# Patient Record
Sex: FEMALE | Race: WHITE | Employment: OTHER | URBAN - METROPOLITAN AREA
[De-identification: names, ages, dates, MRNs, and addresses within clinical notes are randomized per-mention and may not be internally consistent; named-entity substitution may affect disease eponyms.]

---

## 2017-01-11 ENCOUNTER — TELEPHONE (OUTPATIENT)
Dept: ENDOCRINOLOGY | Age: 62
End: 2017-01-11

## 2017-02-07 RX ORDER — ALENDRONATE SODIUM 70 MG/1
TABLET ORAL
Qty: 12 TABLET | Refills: 0 | Status: SHIPPED | OUTPATIENT
Start: 2017-02-07 | End: 2017-05-09 | Stop reason: SDUPTHER

## 2017-11-29 ENCOUNTER — TELEPHONE (OUTPATIENT)
Age: 62
End: 2017-11-29

## 2017-11-29 NOTE — TELEPHONE ENCOUNTER
Pt called and wants to know if a 90 day of fosamax can be sent to mail order pharmacy. Its cheaper on her. Would like a call back to let her know.  She said its ok to leave a message

## 2017-12-01 RX ORDER — ALENDRONATE SODIUM 70 MG/1
TABLET ORAL
Qty: 12 TABLET | Refills: 0 | Status: SHIPPED | OUTPATIENT
Start: 2017-12-01 | End: 2018-11-28

## 2017-12-13 ENCOUNTER — TELEPHONE (OUTPATIENT)
Age: 62
End: 2017-12-13

## 2017-12-15 ENCOUNTER — TELEPHONE (OUTPATIENT)
Age: 62
End: 2017-12-15

## 2017-12-15 NOTE — TELEPHONE ENCOUNTER
Spoke with pharmacy related to alendronate clarification. Patient to dissolve table in water. Alendronate TBEF is not covered and a PA is required.  Pharmacy will fax over a PA to office

## 2017-12-18 ENCOUNTER — TELEPHONE (OUTPATIENT)
Age: 62
End: 2017-12-18

## 2017-12-19 NOTE — TELEPHONE ENCOUNTER
Spoke with Veronica KENNEDY Related to alendronate sodium solution. It is 70mg/75ml per week, 1 pack will cover a month with 4 refills. . Pharmacy will need directions, such as 75ml per week.

## 2017-12-20 ENCOUNTER — TELEPHONE (OUTPATIENT)
Age: 62
End: 2017-12-20

## 2017-12-22 ENCOUNTER — TELEPHONE (OUTPATIENT)
Age: 62
End: 2017-12-22

## 2017-12-22 NOTE — TELEPHONE ENCOUNTER
Galen Song has spent considerable time on this already. When I say \"12\" and would like to say \"liquid,\" our version of Epic does not allow me to say \"liquid\" -- the only logical choice was \"tablets. \"  I did put in the directions that it was to be 70 mg unit dose liquid. I want her to have alendronate liquid (70 mg/5 mL). Spoke with pharmacy tech and pharmacist and clarified order.

## 2018-01-24 ENCOUNTER — TELEPHONE (OUTPATIENT)
Age: 63
End: 2018-01-24

## 2018-04-05 ENCOUNTER — HOSPITAL ENCOUNTER (OUTPATIENT)
Dept: MAMMOGRAPHY | Age: 63
Discharge: HOME | End: 2018-04-05
Attending: INTERNAL MEDICINE | Admitting: INTERNAL MEDICINE

## 2018-04-05 DIAGNOSIS — Z12.31 VISIT FOR SCREENING MAMMOGRAM: ICD-10-CM

## 2018-05-14 ENCOUNTER — OFFICE VISIT (OUTPATIENT)
Age: 63
End: 2018-05-14

## 2018-05-14 VITALS
BODY MASS INDEX: 17.54 KG/M2 | SYSTOLIC BLOOD PRESSURE: 100 MMHG | HEIGHT: 63 IN | DIASTOLIC BLOOD PRESSURE: 62 MMHG | WEIGHT: 99 LBS

## 2018-05-14 DIAGNOSIS — E55.9 VITAMIN D DEFICIENCY: Chronic | ICD-10-CM

## 2018-05-14 DIAGNOSIS — M81.0 OSTEOPOROSIS, POSTMENOPAUSAL: Primary | Chronic | ICD-10-CM

## 2018-05-14 DIAGNOSIS — Z51.81 MEDICATION MONITORING ENCOUNTER: Chronic | ICD-10-CM

## 2018-05-14 PROCEDURE — 3017F COLORECTAL CA SCREEN DOC REV: CPT | Performed by: INTERNAL MEDICINE

## 2018-05-14 PROCEDURE — 99214 OFFICE O/P EST MOD 30 MIN: CPT | Performed by: INTERNAL MEDICINE

## 2018-05-14 PROCEDURE — G8419 CALC BMI OUT NRM PARAM NOF/U: HCPCS | Performed by: INTERNAL MEDICINE

## 2018-05-14 PROCEDURE — 4040F PNEUMOC VAC/ADMIN/RCVD: CPT | Performed by: INTERNAL MEDICINE

## 2018-05-14 PROCEDURE — 1123F ACP DISCUSS/DSCN MKR DOCD: CPT | Performed by: INTERNAL MEDICINE

## 2018-05-14 PROCEDURE — 1090F PRES/ABSN URINE INCON ASSESS: CPT | Performed by: INTERNAL MEDICINE

## 2018-05-14 PROCEDURE — G8427 DOCREV CUR MEDS BY ELIG CLIN: HCPCS | Performed by: INTERNAL MEDICINE

## 2018-05-14 PROCEDURE — 1036F TOBACCO NON-USER: CPT | Performed by: INTERNAL MEDICINE

## 2018-05-14 PROCEDURE — G8399 PT W/DXA RESULTS DOCUMENT: HCPCS | Performed by: INTERNAL MEDICINE

## 2018-05-14 RX ORDER — ALENDRONATE SODIUM 70 MG/1
70 TABLET ORAL WEEKLY
Qty: 4 TABLET | Refills: 4 | Status: SHIPPED | OUTPATIENT
Start: 2018-05-14 | End: 2018-11-28 | Stop reason: SDUPTHER

## 2018-05-30 ENCOUNTER — TELEPHONE (OUTPATIENT)
Age: 63
End: 2018-05-30

## 2018-08-08 ENCOUNTER — OFFICE VISIT (OUTPATIENT)
Dept: ENT CLINIC | Age: 63
End: 2018-08-08

## 2018-08-08 VITALS — HEIGHT: 64 IN | BODY MASS INDEX: 17 KG/M2 | WEIGHT: 99.6 LBS

## 2018-08-08 DIAGNOSIS — M26.622 ARTHRALGIA OF LEFT TEMPOROMANDIBULAR JOINT: ICD-10-CM

## 2018-08-08 DIAGNOSIS — J02.9 SORE THROAT: ICD-10-CM

## 2018-08-08 DIAGNOSIS — H92.02 OTALGIA, LEFT: Primary | ICD-10-CM

## 2018-08-08 PROCEDURE — G8399 PT W/DXA RESULTS DOCUMENT: HCPCS | Performed by: OTOLARYNGOLOGY

## 2018-08-08 PROCEDURE — 3017F COLORECTAL CA SCREEN DOC REV: CPT | Performed by: OTOLARYNGOLOGY

## 2018-08-08 PROCEDURE — 31575 DIAGNOSTIC LARYNGOSCOPY: CPT | Performed by: OTOLARYNGOLOGY

## 2018-08-08 PROCEDURE — 1123F ACP DISCUSS/DSCN MKR DOCD: CPT | Performed by: OTOLARYNGOLOGY

## 2018-08-08 PROCEDURE — 1036F TOBACCO NON-USER: CPT | Performed by: OTOLARYNGOLOGY

## 2018-08-08 PROCEDURE — 4040F PNEUMOC VAC/ADMIN/RCVD: CPT | Performed by: OTOLARYNGOLOGY

## 2018-08-08 PROCEDURE — 1101F PT FALLS ASSESS-DOCD LE1/YR: CPT | Performed by: OTOLARYNGOLOGY

## 2018-08-08 PROCEDURE — 99204 OFFICE O/P NEW MOD 45 MIN: CPT | Performed by: OTOLARYNGOLOGY

## 2018-08-08 PROCEDURE — 1090F PRES/ABSN URINE INCON ASSESS: CPT | Performed by: OTOLARYNGOLOGY

## 2018-08-08 PROCEDURE — G8419 CALC BMI OUT NRM PARAM NOF/U: HCPCS | Performed by: OTOLARYNGOLOGY

## 2018-08-08 PROCEDURE — G8427 DOCREV CUR MEDS BY ELIG CLIN: HCPCS | Performed by: OTOLARYNGOLOGY

## 2018-08-08 ASSESSMENT — ENCOUNTER SYMPTOMS
APNEA: 0
SORE THROAT: 0
BLOOD IN STOOL: 0
VOMITING: 0
WHEEZING: 0
VOICE CHANGE: 0
CONSTIPATION: 0
SHORTNESS OF BREATH: 0
EYE DISCHARGE: 0
SINUS PAIN: 0
RHINORRHEA: 0
TROUBLE SWALLOWING: 0
NAUSEA: 0
COUGH: 0
BACK PAIN: 0
FACIAL SWELLING: 0
DIARRHEA: 0
CHOKING: 0
EYE PAIN: 0
CHEST TIGHTNESS: 0
STRIDOR: 0
COLOR CHANGE: 0
SINUS PRESSURE: 0

## 2018-08-08 NOTE — PROGRESS NOTES
speech difficulty, light-headedness, numbness and headaches. Hematological: Negative for adenopathy. Does not bruise/bleed easily. Psychiatric/Behavioral: Negative for agitation, confusion, self-injury and sleep disturbance. The patient is not nervous/anxious. Objective:   Physical Exam   Constitutional: She is oriented to person, place, and time. She appears well-developed and well-nourished. HENT:   Head: Normocephalic and atraumatic. Not macrocephalic and not microcephalic. Head is without raccoon's eyes, without Thakkar's sign, without abrasion, without contusion, without laceration, without right periorbital erythema and without left periorbital erythema. Hair is normal.       Right Ear: Hearing, tympanic membrane and external ear normal. No drainage, swelling or tenderness. No mastoid tenderness. Tympanic membrane is not perforated, not retracted and not bulging. Tympanic membrane mobility is normal. No middle ear effusion. No decreased hearing is noted. Left Ear: Hearing, tympanic membrane and external ear normal. No drainage, swelling or tenderness. No mastoid tenderness. Tympanic membrane is not perforated, not retracted and not bulging. Tympanic membrane mobility is normal.  No middle ear effusion. No decreased hearing is noted. Ears:    Nose: No mucosal edema, rhinorrhea, nose lacerations, sinus tenderness, nasal deformity, septal deviation or nasal septal hematoma. No epistaxis. No foreign bodies. Right sinus exhibits no maxillary sinus tenderness and no frontal sinus tenderness. Left sinus exhibits no maxillary sinus tenderness and no frontal sinus tenderness. Mouth/Throat: Uvula is midline. Mucous membranes are not pale, not dry and not cyanotic. No oral lesions. No trismus in the jaw. Normal dentition. No dental abscesses, uvula swelling, lacerations or dental caries. No oropharyngeal exudate, posterior oropharyngeal edema, posterior oropharyngeal erythema or tonsillar abscesses. Eyes: Lids are normal. Lids are everted and swept, no foreign bodies found. Right eye exhibits no chemosis, no discharge and no exudate. Left eye exhibits no chemosis, no discharge and no exudate. Right eye exhibits normal extraocular motion and no nystagmus. Left eye exhibits normal extraocular motion and no nystagmus. Neck: Trachea normal. Neck supple. Normal carotid pulses present. No tracheal deviation present. No thyroid mass and no thyromegaly present. Cardiovascular: Normal rate and regular rhythm. Pulmonary/Chest: No stridor. No apnea, no tachypnea and no bradypnea. No respiratory distress. Musculoskeletal:        Right shoulder: She exhibits normal range of motion. Left shoulder: She exhibits normal range of motion. Lymphadenopathy:        Head (right side): No submental, no submandibular, no tonsillar, no preauricular, no posterior auricular and no occipital adenopathy present. Head (left side): No submental, no submandibular, no tonsillar, no preauricular, no posterior auricular and no occipital adenopathy present. Right cervical: No superficial cervical, no deep cervical and no posterior cervical adenopathy present. Left cervical: No superficial cervical, no deep cervical and no posterior cervical adenopathy present. Neurological: She is alert and oriented to person, place, and time. Skin: No bruising, no laceration and no lesion noted. No erythema. Psychiatric: She has a normal mood and affect. Her speech is normal and behavior is normal.     Pre op: ear and throat pain, left. Post op: mild inflammation. Procedure : FlexibleLaryngoscopy  Surgeon: Angelita Vargas  Anesthesia: Afrin with 2% lidocaine  Note: After consent and anesthesia a flexible laryngoscopy was performed. There were no lesions in the nasopharynx, BOT, Tonsil, Pharynx, supraglottis or larynx. Pertinent findings: No lesion, masses or ulcers. Good vocal fold motion.    Tolerated without complication. Assessment:       Diagnosis Orders   1. Otalgia, left     2. Sore throat     3. Arthralgia of left temporomandibular joint             Plan:      Low concern for Fosamax and mandibular necrosis/   Likely low viral infection. F/U as needed or if symptoms worsen.          Asuncion García MD

## 2018-08-09 ENCOUNTER — TELEPHONE (OUTPATIENT)
Age: 63
End: 2018-08-09

## 2018-08-09 NOTE — TELEPHONE ENCOUNTER
Pt is having trouble with pain in her throat and jaw , she went to ent doctor yesterday . They are wondering if it could be the fosamax causing this. . She would like to know what Dr. Lucretia Chaves thinks

## 2018-11-28 RX ORDER — ALENDRONATE SODIUM 70 MG/1
TABLET ORAL
Qty: 4 TABLET | Refills: 2 | Status: SHIPPED | OUTPATIENT
Start: 2018-11-28 | End: 2019-03-25 | Stop reason: SDUPTHER

## 2019-03-26 RX ORDER — ALENDRONATE SODIUM 70 MG/1
TABLET ORAL
Qty: 4 TABLET | Refills: 3 | Status: SHIPPED | OUTPATIENT
Start: 2019-03-26 | End: 2019-05-20 | Stop reason: SDUPTHER

## 2019-04-22 ENCOUNTER — HOSPITAL ENCOUNTER (OUTPATIENT)
Dept: MAMMOGRAPHY | Age: 64
Discharge: HOME | End: 2019-04-22
Payer: MEDICARE

## 2019-04-22 DIAGNOSIS — R92.8 ABNORMAL MAMMOGRAM: ICD-10-CM

## 2019-04-22 DIAGNOSIS — Z12.31 VISIT FOR SCREENING MAMMOGRAM: ICD-10-CM

## 2019-04-22 PROCEDURE — 77065 DX MAMMO INCL CAD UNI: CPT

## 2019-04-22 PROCEDURE — 77063 BREAST TOMOSYNTHESIS BI: CPT

## 2019-05-20 ENCOUNTER — HOSPITAL ENCOUNTER (OUTPATIENT)
Dept: GENERAL RADIOLOGY | Age: 64
Discharge: HOME | End: 2019-05-20
Payer: MEDICARE

## 2019-05-20 ENCOUNTER — OFFICE VISIT (OUTPATIENT)
Dept: ENDOCRINOLOGY | Age: 64
End: 2019-05-20
Payer: MEDICARE

## 2019-05-20 ENCOUNTER — PROCEDURE VISIT (OUTPATIENT)
Dept: ENDOCRINOLOGY | Age: 64
End: 2019-05-20
Payer: MEDICARE

## 2019-05-20 VITALS
WEIGHT: 100.2 LBS | HEIGHT: 63 IN | SYSTOLIC BLOOD PRESSURE: 95 MMHG | DIASTOLIC BLOOD PRESSURE: 60 MMHG | BODY MASS INDEX: 17.75 KG/M2

## 2019-05-20 DIAGNOSIS — E55.9 VITAMIN D DEFICIENCY: ICD-10-CM

## 2019-05-20 DIAGNOSIS — Z51.81 MEDICATION MONITORING ENCOUNTER: ICD-10-CM

## 2019-05-20 DIAGNOSIS — M81.0 OSTEOPOROSIS, POSTMENOPAUSAL: Primary | ICD-10-CM

## 2019-05-20 DIAGNOSIS — M81.0 OSTEOPOROSIS, POSTMENOPAUSAL: Chronic | ICD-10-CM

## 2019-05-20 DIAGNOSIS — M81.0 OSTEOPOROSIS, POSTMENOPAUSAL: ICD-10-CM

## 2019-05-20 PROCEDURE — G8399 PT W/DXA RESULTS DOCUMENT: HCPCS | Performed by: INTERNAL MEDICINE

## 2019-05-20 PROCEDURE — 4040F PNEUMOC VAC/ADMIN/RCVD: CPT | Performed by: INTERNAL MEDICINE

## 2019-05-20 PROCEDURE — 1123F ACP DISCUSS/DSCN MKR DOCD: CPT | Performed by: INTERNAL MEDICINE

## 2019-05-20 PROCEDURE — 77080 DXA BONE DENSITY AXIAL: CPT

## 2019-05-20 PROCEDURE — G8419 CALC BMI OUT NRM PARAM NOF/U: HCPCS | Performed by: INTERNAL MEDICINE

## 2019-05-20 PROCEDURE — 1090F PRES/ABSN URINE INCON ASSESS: CPT | Performed by: INTERNAL MEDICINE

## 2019-05-20 PROCEDURE — 99215 OFFICE O/P EST HI 40 MIN: CPT | Performed by: INTERNAL MEDICINE

## 2019-05-20 PROCEDURE — G8427 DOCREV CUR MEDS BY ELIG CLIN: HCPCS | Performed by: INTERNAL MEDICINE

## 2019-05-20 PROCEDURE — 1036F TOBACCO NON-USER: CPT | Performed by: INTERNAL MEDICINE

## 2019-05-20 PROCEDURE — 77080 DXA BONE DENSITY AXIAL: CPT | Performed by: INTERNAL MEDICINE

## 2019-05-20 PROCEDURE — 3017F COLORECTAL CA SCREEN DOC REV: CPT | Performed by: INTERNAL MEDICINE

## 2019-05-20 RX ORDER — ALENDRONATE SODIUM 70 MG/1
TABLET ORAL
Qty: 12 TABLET | Refills: 4 | Status: SHIPPED | OUTPATIENT
Start: 2019-05-20 | End: 2020-06-29

## 2019-05-20 NOTE — RESULT ENCOUNTER NOTE
[facility]) Osteoporosis and [address] [place] [address]   Phone [phone]  Fax [phone]    NAME: Favio Gar   : 1955   STUDY DATE: 2019     REFERRING PROVIDER: Jimbo Shell MD    INDICATION(S) FOR PERFORMING THE STUDY:  osteoporosis, age related (M81.0)    CLINICAL INFORMATION PROVIDED BY THE PATIENT: 68-year-old woman. Natural menopause was age 44. She fractured her right shoulder 2018 (slipped on ice). No long-term corticosteroid use. She took alendronate 2017-2011 (stopped for a [place]). Alendronate was re-started 2014. Family history of osteoporosis (mother and father). EQUIPMENT: [employer]. POSITIONING: Good. REGIONS OF INTEREST: Correct. ARTIFACTS: None. STUDY VALID? Yes. Spine BMD is spuriously high because of generalized degenerative change. No adjustments were made. T-scores  Initial study: 2003* spine L1-L4 Invalid Lowest hip (right fem. neck) -1.9   Current study: 2017* spine L1-L4 ND Lowest hip (right fem. neck) -2.8     The table below shows bone mineral density (grams/cm2), the appropriate measure for comparing serial scans An increase or decrease is significant based on precision studies done at our center according to the ISCD protocol. Proximal Femur (right)   Date Fem. neck Trochanter Total hip   2003* 0.643 0.480 0.766   2006* 0.609 0.460 0.722   2006* 0.589 0.447 0.702   2007* 0.570 0.478 0.694   2008 0.581 0.547 0.695   2009 0.572 0.526 0.675   2010 0.557 0.529 0.667   2011 0.569 0.518 0.658   12/10/2012 0.595 0.450 0.649   10/23/2014* 0.516 0.347 0.598   2016* 0.526 0.358 0.608   2017* 0.540 0.352 0.621   2019 0.538 0.423 0.611   *Done with [employer] equipment; BMD converted to Hologic units. IMPRESSION:  BONE DENSITY IS LOW, CONSISTENT WITH OSTEOPOROSIS.    SINCE THE LAST DXA, BMD DID NOT CHANGE SIGNIFICANTLY IN THE RIGHT HIP. Consider repeating this study in 1-2 years to assess the patient's progress. _________________________________________________   Jyoti Bentley MD, Director, [facility] ([facility]) Osteoporosis and [address] [place] [address]

## 2019-05-20 NOTE — PROGRESS NOTES
[facility] Osteoporosis and [address] [facility]  Phone [phone]  Fax [phone]    PATIENT NAME: CHRISTA MALIK  YOB: 1955  INITIAL CONSULTATION: 12/06/2006  LAST OFFICE VISIT: 05/14/2018  TODAY'S VISIT: 05/20/2019           Labs @  11/2018    PROBLEMS:   Osteoporosis by DXA 12/2006, lowest T-score -2.6 in the right trochanter (Z-score -2.0)     Family history of osteoporosis, mother and father    Low bone density by DXA done 07/2002, lowest Z-score -1.5 LFN    BMD decreased 2003-02/2006, 02/2006-12/2006, 2012/2014, lowest T-score -3.0 RFN    Fractured right shoulder 01/2018 (hard fall, slipped on ice). Natural menopause age 44 (2006), hysterectomy 11/2018  Vitamin D deficiency, desirable 25-hydroxvvitamin D is 30 to 60    25 ng/mL 05/2006    41 ng/mL 12/2006 with vitamin D 1700 IU daily    42 ng/mL 12/2014    49 ng/mL 12/2017  Allergies and asthma (asthma is asymptomatic)    CURRENT MANAGEMENT FOR BONE HEALTH:   Calcium, diet 600 mg/d + supplement 375 mg/d = 975 mg/d  Vitamin D, multivitamin 200 IU/d + 500 IU along with calcium + 1000 IU = 1700 IU/d  Exercise, Theresa, cardio and strength training 5 x weekly  Pharmacologic therapy, Fosamax liquid 70 mg weekly started 01/2007, changed to tablets 2010 when liquid was not available, stopped 11/2011 for a drug holiday, re-started 12/2014 (dissolved in water)    PREVIOUS MEDICATIONS FOR OSTEOPOROSIS:   Actonel 35 mg weekly, 04/2006 stopped due to nausea, headaches  Evista 05/2006 stopped due to hot flushes    OTHER CURRENT MEDICATIONS (SELECTED): Qvar, albuterol, spironolactone, Retin-A,   OTC MEDICATIONS: Selenium, fish oil    CHIEF COMPLAINT: Here for f/u visit of osteoporosis and vitamin D deficiency. No new related signs or symptoms.     PAST MEDICAL HISTORY, FAMILY HISTORY, SOCIAL HISTORY AND REVIEW OF SYSTEMS:  Relevant changes since last visit (see patient questionnaire of today's date).    SUBJECTIVE:  See problem list for active/ongoing issues. She has been taking alendronate liquid but doesnt like the taste and would like to go back to dissolving the tablets in water. She slipped on ice 01/2018, had a hard fall and fractured her right shoulder which is now healed and back to normal.  She feels well overall. NEUROLOGIC EXAM: Able to rise from chair without using arms. No apparent focal motor or sensory deficit. Reflexes brisk and symmetric. Coordination appears normal.    MUSCULOSKELETAL EXAM: Gait: Intact without difficulty. Steady without assistance. Spine: Spinal contours are normal.  No spine tenderness to palpation or percussion. Ribs and pelvis: Ribs appear normal. Two finger spaces between ribs and pelvis. BONE DENSITY: Most recent done here using Hologic equipment. T-scores  Initial study: 02/26/2003* spine L1-L4 Invalid Lowest hip (right fem. neck) -1.9   Current study: 12/12/2017* spine L1-L4 ND Lowest hip (right fem. neck) -2.8     The table below shows bone mineral density (grams/cm2), the appropriate measure for comparing serial scans An increase or decrease is significant based on precision studies done at our center according to the ISCD protocol. Proximal Femur (right)   Date Fem. neck Trochanter Total hip   02/26/2003* 0.643 0.480 0.766   02/02/2006* 0.609 0.460 0.722   12/06/2006* 0.589 0.447 0.702   12/12/2007* 0.570 0.478 0.694   12/17/2008 0.581 0.547 0.695   12/23/2009 0.572 0.526 0.675   11/03/2010 0.557 0.529 0.667   11/09/2011 0.569 0.518 0.658   12/10/2012 0.595 0.450 0.649   10/23/2014* 0.516 0.347 0.598   01/04/2016* 0.526 0.358 0.608   12/12/2017* 0.540 0.352 0.621   05/20/2019 0.538 0.423 0.611   *Done with [employer] equipment; BMD converted to Hologic units. IMPRESSION:  BONE DENSITY IS LOW, CONSISTENT WITH OSTEOPOROSIS. SINCE THE LAST DXA, BMD DID NOT CHANGE SIGNIFICANTLY IN THE RIGHT HIP. LABS. 05/2015, Ca 8.9, Cr 0.7. 04/2018 Ca 9.2 Cr 0.6. 11/2018 Ca 8.9 Cr 0.5. IMAGING. DXA printouts reviewed. ASSESSMENT: Idiopathic osteoporosis with density lower than expected and lower than desirable. Bone density decreased from 2003 to 2006 and further from 02/2006 to 12/2006. Her strong family history has contributed to low BMD as well as vitamin D deficiency. Since taking Fosamax 01/2007-11/2011, bone density had been stable or increased, indicating a good response to treatment. Between 2011 and 2012, BMD may have decreased in the trochanter and 2012 and 2014 decreased at all hip sites. Alendronate was started 12/2014 and BMD has stabilized. She has not been regular about her calcium supplement. PLANS: Continue alendronate 70 mg weekly. She has been dissolving it in water but will try the tablets once again. We reviewed the Calcium Intake Calculator and looked at options for calcium supplements. She should aim for ~300 mg BID. Return appointment in 1 year with DXA      I spent 40 minutes face to face with this patient. Over 50% of that time was spent on counseling and care coordination. See assessment and plan for counseling and care coordination details. Verona Raines MD, director, [facility]) Osteoporosis and Bone Health Services    CC: Flor Damon MD

## 2020-08-18 ENCOUNTER — HOSPITAL ENCOUNTER (OUTPATIENT)
Dept: MAMMOGRAPHY | Age: 65
Discharge: HOME | End: 2020-08-18
Payer: MEDICARE

## 2020-08-18 PROCEDURE — 77063 BREAST TOMOSYNTHESIS BI: CPT

## 2020-10-19 ENCOUNTER — HOSPITAL ENCOUNTER (OUTPATIENT)
Dept: GENERAL RADIOLOGY | Age: 65
Discharge: HOME | End: 2020-10-19
Payer: MEDICARE

## 2020-10-19 ENCOUNTER — OFFICE VISIT (OUTPATIENT)
Dept: ENDOCRINOLOGY | Age: 65
End: 2020-10-19
Payer: MEDICARE

## 2020-10-19 ENCOUNTER — PROCEDURE VISIT (OUTPATIENT)
Dept: ENDOCRINOLOGY | Age: 65
End: 2020-10-19

## 2020-10-19 VITALS
HEIGHT: 63 IN | TEMPERATURE: 97.5 F | WEIGHT: 99.2 LBS | BODY MASS INDEX: 17.58 KG/M2 | DIASTOLIC BLOOD PRESSURE: 60 MMHG | SYSTOLIC BLOOD PRESSURE: 100 MMHG

## 2020-10-19 PROCEDURE — G8427 DOCREV CUR MEDS BY ELIG CLIN: HCPCS | Performed by: INTERNAL MEDICINE

## 2020-10-19 PROCEDURE — 1036F TOBACCO NON-USER: CPT | Performed by: INTERNAL MEDICINE

## 2020-10-19 PROCEDURE — G8399 PT W/DXA RESULTS DOCUMENT: HCPCS | Performed by: INTERNAL MEDICINE

## 2020-10-19 PROCEDURE — 3017F COLORECTAL CA SCREEN DOC REV: CPT | Performed by: INTERNAL MEDICINE

## 2020-10-19 PROCEDURE — 1090F PRES/ABSN URINE INCON ASSESS: CPT | Performed by: INTERNAL MEDICINE

## 2020-10-19 PROCEDURE — 99214 OFFICE O/P EST MOD 30 MIN: CPT | Performed by: INTERNAL MEDICINE

## 2020-10-19 PROCEDURE — 4040F PNEUMOC VAC/ADMIN/RCVD: CPT | Performed by: INTERNAL MEDICINE

## 2020-10-19 PROCEDURE — 77080 DXA BONE DENSITY AXIAL: CPT

## 2020-10-19 PROCEDURE — G8419 CALC BMI OUT NRM PARAM NOF/U: HCPCS | Performed by: INTERNAL MEDICINE

## 2020-10-19 PROCEDURE — 77080 DXA BONE DENSITY AXIAL: CPT | Performed by: INTERNAL MEDICINE

## 2020-10-19 PROCEDURE — 1123F ACP DISCUSS/DSCN MKR DOCD: CPT | Performed by: INTERNAL MEDICINE

## 2020-10-19 PROCEDURE — G8484 FLU IMMUNIZE NO ADMIN: HCPCS | Performed by: INTERNAL MEDICINE

## 2020-10-19 RX ORDER — ALENDRONATE SODIUM 70 MG/1
TABLET ORAL
Qty: 12 TABLET | Refills: 1 | Status: SHIPPED | OUTPATIENT
Start: 2020-10-19 | End: 2022-01-13 | Stop reason: SDUPTHER

## 2020-10-19 RX ORDER — MONTELUKAST SODIUM 10 MG/1
10 TABLET ORAL NIGHTLY
COMMUNITY

## 2020-10-19 RX ORDER — ZOLPIDEM TARTRATE 5 MG/1
TABLET ORAL
COMMUNITY
Start: 2020-10-19 | End: 2020-11-18

## 2020-10-19 RX ORDER — LEVOCETIRIZINE DIHYDROCHLORIDE 5 MG/1
TABLET, FILM COATED ORAL
COMMUNITY
Start: 2020-10-01

## 2020-10-19 RX ORDER — FAMOTIDINE 10 MG
20 TABLET ORAL 2 TIMES DAILY
COMMUNITY

## 2020-10-19 NOTE — PROGRESS NOTES
[facility]) Osteoporosis and [address] [facility] [address], [address] [facility]  Phone [phone]  Fax [phone]    PATIENT NAME: Carroll Baker OF BIRTH: 1955  INITIAL CONSULTATION: 12/06/2006  LAST OFFICE VISIT: 05/20/2019  TODAY'S VISIT: 10/19/2020    Labs @ [facility] 11/2019    PROBLEMS:   Osteoporosis by DXA 12/2006, lowest T-score -2.6 in the right trochanter (Z-score -2.0)     Family history of osteoporosis, mother and father    Low bone density by DXA done 07/2002, lowest Z-score -1.5 LFN    BMD decreased 2003-02/2006, 02/2006-12/2006, 2012/2014, lowest T-score -3.0 RFN    Fractured right shoulder 01/2018 (hard fall, slipped on ice). Natural menopause age 44 (2006), hysterectomy 11/2018  Vitamin D deficiency, desirable 25-hydroxvvitamin D is 30 to 60    25 ng/mL 05/2006    41 ng/mL 12/2006 with vitamin D 1700 IU daily    42 ng/mL 12/2014    49 ng/mL 12/2017 2020 NEW/RECURRENT, chronic urticaria    CURRENT MANAGEMENT FOR BONE HEALTH:   Calcium, diet 600 mg/d + supplement 375 mg/d = 975 mg/d  Vitamin D, multivitamin 200 IU/d + 500 IU along with calcium + 1000 IU = 1700 IU/d  Exercise, Theresa, cardio and strength training 5 x weekly  Pharmacologic therapy, Fosamax liquid 70 mg weekly started 01/2007, changed to tablets 2010 when liquid was not available, stopped 11/2011 for a drug holiday, re-started 12/2014 (dissolved in water)    PREVIOUS MEDICATIONS FOR OSTEOPOROSIS:   Actonel 35 mg weekly, 04/2006 stopped due to nausea, headaches  Evista 05/2006 stopped due to hot flushes    OTHER CURRENT MEDICATIONS (SELECTED): Qvar, albuterol, spironolactone, Retin-A,   OTC MEDICATIONS: Selenium, fish oil    CHIEF COMPLAINT: Here for f/u visit of osteoporosis and vitamin D deficiency. No new related signs or symptoms.     PAST MEDICAL HISTORY, FAMILY HISTORY, SOCIAL HISTORY AND REVIEW OF SYSTEMS:  Relevant changes since last visit (see patient questionnaire of today's date).    INTERVAL HISTORY:  See problem list for active/ongoing issues. She has been taking alendronate dissolved in water, correctly and without side effects. No falls, near-falls or fractures. Recently had a steroid taper for chronic urticaria. NEUROLOGIC EXAM: Able to rise from chair without using arms. No apparent focal motor or sensory deficit. Reflexes brisk and symmetric. Coordination appears normal.    MUSCULOSKELETAL EXAM: Gait: Intact without difficulty. Steady without assistance. Spine: Spinal contours are normal.  No spine tenderness to palpation or percussion. Ribs and pelvis: Ribs appear normal. Two finger spaces between ribs and pelvis. BONE DENSITY: Most recent done here using Hologic equipment. T-scores  Initial study: 02/26/2003* spine L1-L4 Invalid Lowest hip (right fem. neck) -1.9   Current study: 10/19/2020 spine L1-L4 ND Lowest hip (right fem. neck) -2.8     The table below shows bone mineral density (grams/cm2), the appropriate measure for comparing serial scans. An increase or decrease is significant based on precision studies done at our center according to the ISCD protocol. Proximal Femur (right)   Date Fem. neck Trochanter Total hip   02/26/2003* 0.643 0.480 0.766   02/02/2006* 0.609 0.460 0.722   12/06/2006* 0.589 0.447 0.702   12/12/2007* 0.570 0.478 0.694   12/17/2008 0.581 0.547 0.695   12/23/2009 0.572 0.526 0.675   11/03/2010 0.557 0.529 0.667   11/09/2011 0.569 0.518 0.658   12/10/2012 0.595 0.450 0.649   10/23/2014* 0.516 0.347 0.598   01/04/2016* 0.526 0.358 0.608   12/12/2017* 0.540 0.352 0.621   05/20/2019 0.538 0.423 0.611   10/19/2020 0.543 0.414 0.602   *Done with [employer] equipment; BMD converted to Hologic units. IMPRESSION:  BONE DENSITY IS LOW, CONSISTENT WITH OSTEOPOROSIS.    SINCE THE LAST DXA, BMD DID NOT CHANGE SIGNIFICANTLY IN THE RIGHT HIP. COMPARED WITH 2014, BEFORE STARTING ALENDRONATE, BMD IS HIGHER NOW IN THE TROCHANTER AND TRENDING UP IN THE FEMORAL NECK. LABS. 05/2015, Ca 8.9, Cr 0.7. 04/2018 Ca 9.2 Cr 0.6. 11/2018 Ca 8.9 Cr 0.5. 11/2019 Cr 0.6 iCa 4.6. IMAGING. DXA printouts reviewed. ASSESSMENT: Idiopathic osteoporosis with density lower than expected and lower than desirable. Bone density decreased from 2003 to 2006 and further from 02/2006 to 12/2006. Her strong family history has contributed to low BMD as well as vitamin D deficiency. Since taking Fosamax 01/2007-11/2011, bone density had been stable or increased, indicating a good response to treatment. Between 2011 and 2012, BMD may have decreased in the trochanter and 2012 and 2014 decreased at all hip sites. Alendronate was started 12/2014 and BMD has stabilized. PLANS: Continue alendronate 70 mg weekly dissolved in water. Return appointment in 1 year with DXA      I spent 25 minutes face to face with this patient. Over 50% of that time was spent on counseling and care coordination. See assessment and plan for counseling and care coordination details. Checo Bentley MD, Director, [facility]) Osteoporosis and Bone Health Services    CC: River Cardenas MD

## 2020-10-19 NOTE — RESULT ENCOUNTER NOTE
THE LAST DXA, BMD DID NOT CHANGE SIGNIFICANTLY IN THE RIGHT HIP. COMPARED WITH 2014, BEFORE STARTING ALENDRONATE, BMD IS HIGHER NOW IN THE TROCHANTER AND TRENDING UP IN THE FEMORAL NECK. Consider repeating this study in 1-2 years to assess the patient's progress. _________________________________________________   Desiree Bentley MD, Director, [facility]) Osteoporosis and [address] [place] [address]

## 2021-08-19 ENCOUNTER — HOSPITAL ENCOUNTER (OUTPATIENT)
Dept: MAMMOGRAPHY | Age: 66
Discharge: HOME | End: 2021-08-19
Payer: MEDICARE

## 2021-08-19 VITALS — WEIGHT: 100 LBS | BODY MASS INDEX: 17.72 KG/M2 | HEIGHT: 63 IN

## 2021-08-19 DIAGNOSIS — Z12.31 VISIT FOR SCREENING MAMMOGRAM: ICD-10-CM

## 2021-08-19 PROCEDURE — 77063 BREAST TOMOSYNTHESIS BI: CPT

## 2022-01-13 ENCOUNTER — TELEPHONE (OUTPATIENT)
Dept: ENDOCRINOLOGY | Age: 67
End: 2022-01-13

## 2022-01-13 RX ORDER — ALENDRONATE SODIUM 70 MG/1
TABLET ORAL
Qty: 12 TABLET | Refills: 4 | Status: SHIPPED | OUTPATIENT
Start: 2022-01-13 | End: 2022-04-11 | Stop reason: SDUPTHER

## 2022-01-13 NOTE — TELEPHONE ENCOUNTER
Patient is requesting a refill of her alendronate (FOSAMAX) 70 MG tablet           [facility] [place] [address], [address] [facility] [address], [address]   Phone:  [phone]  Fax:  [phone]

## 2022-04-11 ENCOUNTER — OFFICE VISIT (OUTPATIENT)
Dept: ENDOCRINOLOGY | Age: 67
End: 2022-04-11
Payer: MEDICARE

## 2022-04-11 ENCOUNTER — HOSPITAL ENCOUNTER (OUTPATIENT)
Dept: GENERAL RADIOLOGY | Age: 67
Discharge: HOME | End: 2022-04-11
Payer: MEDICARE

## 2022-04-11 ENCOUNTER — PROCEDURE VISIT (OUTPATIENT)
Dept: ENDOCRINOLOGY | Age: 67
End: 2022-04-11

## 2022-04-11 VITALS
SYSTOLIC BLOOD PRESSURE: 110 MMHG | DIASTOLIC BLOOD PRESSURE: 60 MMHG | WEIGHT: 97.8 LBS | BODY MASS INDEX: 17.33 KG/M2 | HEIGHT: 63 IN

## 2022-04-11 DIAGNOSIS — Z51.81 MEDICATION MONITORING ENCOUNTER: ICD-10-CM

## 2022-04-11 DIAGNOSIS — E55.9 VITAMIN D DEFICIENCY: ICD-10-CM

## 2022-04-11 DIAGNOSIS — M81.0 OSTEOPOROSIS, POSTMENOPAUSAL: Chronic | ICD-10-CM

## 2022-04-11 DIAGNOSIS — M81.0 OSTEOPOROSIS, POSTMENOPAUSAL: ICD-10-CM

## 2022-04-11 DIAGNOSIS — M81.0 OSTEOPOROSIS, POSTMENOPAUSAL: Primary | ICD-10-CM

## 2022-04-11 PROCEDURE — 99214 OFFICE O/P EST MOD 30 MIN: CPT | Performed by: INTERNAL MEDICINE

## 2022-04-11 PROCEDURE — 3017F COLORECTAL CA SCREEN DOC REV: CPT | Performed by: INTERNAL MEDICINE

## 2022-04-11 PROCEDURE — 1090F PRES/ABSN URINE INCON ASSESS: CPT | Performed by: INTERNAL MEDICINE

## 2022-04-11 PROCEDURE — G8419 CALC BMI OUT NRM PARAM NOF/U: HCPCS | Performed by: INTERNAL MEDICINE

## 2022-04-11 PROCEDURE — 4040F PNEUMOC VAC/ADMIN/RCVD: CPT | Performed by: INTERNAL MEDICINE

## 2022-04-11 PROCEDURE — G8399 PT W/DXA RESULTS DOCUMENT: HCPCS | Performed by: INTERNAL MEDICINE

## 2022-04-11 PROCEDURE — 77080 DXA BONE DENSITY AXIAL: CPT

## 2022-04-11 PROCEDURE — 1123F ACP DISCUSS/DSCN MKR DOCD: CPT | Performed by: INTERNAL MEDICINE

## 2022-04-11 PROCEDURE — 77080 DXA BONE DENSITY AXIAL: CPT | Performed by: INTERNAL MEDICINE

## 2022-04-11 PROCEDURE — G8427 DOCREV CUR MEDS BY ELIG CLIN: HCPCS | Performed by: INTERNAL MEDICINE

## 2022-04-11 PROCEDURE — 1036F TOBACCO NON-USER: CPT | Performed by: INTERNAL MEDICINE

## 2022-04-11 RX ORDER — ALENDRONATE SODIUM 70 MG/1
TABLET ORAL
Qty: 12 TABLET | Refills: 4 | Status: SHIPPED | OUTPATIENT
Start: 2022-04-11

## 2022-04-11 NOTE — RESULT ENCOUNTER NOTE
[facility]) Osteoporosis and [address] [facility] [address] [facility], [address] [place] [address],[facility]  Phone [phone]  Fax [phone]    NAME: Maria Del Rosario Newton   : 1955   STUDY DATE: 2022     REFERRING PROVIDER: Gibran Steve MD    INDICATION(S) FOR PERFORMING THE STUDY:  osteoporosis, age related (M81.0)    CLINICAL INFORMATION PROVIDED BY THE PATIENT: 26-year-old woman. Natural menopause was age 44. She fractured her right shoulder 2018 (slipped on ice). No long-term corticosteroid use. She took alendronate 2007-2011 (stopped for a [place]). Alendronate was re-started 2014. Family history of osteoporosis (mother and father). EQUIPMENT: [employer]. POSITIONING: Good. REGIONS OF INTEREST: Correct. ARTIFACTS: None. STUDY VALID? Yes. Spine BMD is spuriously high because of generalized degenerative change. No adjustments were made. T-scores  Initial study: 2003* spine L1-L4 Invalid Lowest hip (right fem. neck) -1.9   Current study: 2022 spine L1-L4 ND Lowest hip (right fem. neck) -2.7     The table below shows bone mineral density (grams/cm2), the appropriate measure for comparing serial scans. An increase or decrease is significant based on precision studies done at our center according to the ISCD protocol. Proximal Femur (right)   Date Fem. neck Trochanter Total hip   2003* 0.643 0.480 0.766   2006* 0.589 0.447 0.702   2007* 0.570 0.478 0.694   2011 0.569 0.518 0.658   12/10/2012 0.595 0.450 0.649   10/23/2014* 0.516 0.347 0.598   2017* 0.540 0.352 0.621   10/19/2020 0.543 0.414 0.602   2022 0.551 0.443 0.612   *Done with [employer] equipment; BMD converted to Hologic units. IMPRESSION:  BONE DENSITY IS LOW, CONSISTENT WITH OSTEOPOROSIS.    SINCE THE LAST DXA, BMD DID NOT CHANGE SIGNIFICANTLY IN THE RIGHT HIP. COMPARED WITH 2014, BEFORE STARTING ALENDRONATE, BMD IS HIGHER NOW IN THE FEMORAL NECK AND TROCHANTER AND TRENDING UP IN THE TOTAL HIP. Consider repeating this study in 1-2 years to assess the patient's progress. _________________________________________________   Rojelio Bentley MD, Director, [facility] ([facility]) Osteoporosis and [address] [place] [address]

## 2022-04-11 NOTE — PROGRESS NOTES
[facility] [address] [facility]  Phone [phone]  Fax [phone]    PATIENT NAME: Varinder Mattson OF BIRTH: 1955  INITIAL CONSULTATION: 12/06/2006  LAST OFFICE VISIT: 10/19/2020  TODAY'S VISIT: 04/11/2022    Labs @  06/2021    PROBLEMS:   Osteoporosis by DXA 12/2006, lowest T-score -2.6 in the right trochanter (Z-score -2.0)     Family history of osteoporosis, mother and father    Low bone density by DXA done 07/2002, lowest Z-score -1.5 LFN    BMD decreased 2003-02/2006, 02/2006-12/2006, 2012/2014, lowest T-score -3.0 RFN    Fractured right shoulder 01/2018 (hard fall, slipped on ice)    11/2021 fractured patella (fell hard), surgery required. Natural menopause age 44 (2006), hysterectomy 11/2018  Vitamin D deficiency, desirable 25-hydroxvvitamin D is 30 to 60    25 ng/mL 05/2006    41 ng/mL 12/2006 with vitamin D 1700 IU daily    42 ng/mL 12/2014    49 ng/mL 12/2017 2020 NEW/RECURRENT, chronic urticaria    CURRENT MANAGEMENT FOR BONE HEALTH:   Calcium, diet 600 mg/d + supplement 375 mg/d = 975 mg/d  Vitamin D, multivitamin 200 IU/d + 500 IU along with calcium + 1000 IU = 1700 IU/d  Exercise, Theresa, cardio and strength training 5 x weekly  Pharmacologic therapy, Fosamax liquid 70 mg weekly started 01/2007, changed to tablets 2010 when liquid was not available, stopped 11/2011 for a holiday, re-started 12/2014 (dissolved in water)    PREVIOUS MEDICATIONS FOR OSTEOPOROSIS:   Actonel 35 mg weekly, 04/2006 stopped due to nausea, headaches  Evista 05/2006 stopped due to hot flushes    OTHER CURRENT MEDICATIONS (SELECTED): Qvar, albuterol, spironolactone, Retin-A,   OTC MEDICATIONS: Selenium, fish oil    CHIEF COMPLAINT: Here for f/u visit of osteoporosis and vitamin D deficiency. No new related signs or symptoms.     PAST MEDICAL HISTORY, FAMILY HISTORY, SOCIAL HISTORY AND REVIEW OF SYSTEMS:  Relevant changes since last visit (see patient questionnaire of today's date). INTERVAL HISTORY:  See problem list for active/ongoing issues. She has been taking alendronate dissolved in water, correctly and without side effects. 11/2021, fell hard, fractured patella. Surgery required. Still working with PT.    NEUROLOGIC EXAM: Able to rise from chair without using arms. No apparent focal motor or sensory deficit. Reflexes brisk and symmetric. Coordination appears normal.  MUSCULOSKELETAL EXAM: Gait: Intact without difficulty. Steady without assistance. Spine: Spinal contours are normal.  No spine tenderness to palpation or percussion. Ribs and pelvis: Ribs appear normal. Two finger spaces between ribs and pelvis. BONE DENSITY: Most recent done here using Hologic equipment. T-scores  Initial study: 02/26/2003* spine L1-L4 Invalid Lowest hip (right fem. neck) -1.9   Current study: 04/11/2022 spine L1-L4 ND Lowest hip (right fem. neck) -2.7     The table below shows bone mineral density (grams/cm2), the appropriate measure for comparing serial scans. An increase or decrease is significant based on precision studies done at our center according to the ISCD protocol. Proximal Femur (right)   Date Fem. neck Trochanter Total hip   02/26/2003* 0.643 0.480 0.766   12/06/2006* 0.589 0.447 0.702   12/12/2007* 0.570 0.478 0.694   11/09/2011 0.569 0.518 0.658   12/10/2012 0.595 0.450 0.649   10/23/2014* 0.516 0.347 0.598   12/12/2017* 0.540 0.352 0.621   10/19/2020 0.543 0.414 0.602   04/11/2022 0.551 0.443 0.612   *Done with [employer] equipment; BMD converted to Hologic units. IMPRESSION:  BONE DENSITY IS LOW, CONSISTENT WITH OSTEOPOROSIS. SINCE THE LAST DXA, BMD DID NOT CHANGE SIGNIFICANTLY IN THE RIGHT HIP. COMPARED WITH 2014, BEFORE STARTING ALENDRONATE, BMD IS HIGHER NOW IN THE FEMORAL NECK AND TROCHANTER AND TRENDING UP IN THE TOTAL HIP.      LABS. 05/2015, Ca 8.9, Cr 0.7. 04/2018 Ca 9.2 Cr 0.6. 11/2018 Ca 8.9 Cr 0.5. 11/2019 Cr 0.6 iCa 4.6.  06/2021 Ca 9.3 Cr 0.7. IMAGING. DXA printouts reviewed. 06/2021 LS XR.    ASSESSMENT: Idiopathic osteoporosis, bone density lower than expected and lower than desirable. Bone density decreased [id number] and further 02/2006-12/2006. Her strong family history has contributed to low BMD as well as vitamin D deficiency. Since taking Fosamax 01/2007-11/2011, bone density had been stable or increased, indicating a good response to treatment. Between 2011 and 2012, BMD may have decreased in the trochanter and 2012 and 2014 decreased at all hip sites. Alendronate was restarted 12/2014 and BMD has improved. Patella fracture is not related to osteoporosis. PLANS: Continue alendronate 70 mg weekly dissolved in water. Discussed possible holiday after 10 years (12/2024). Return appointment in 1 year with DXA. Time spent today: 30-39 minutes. Christoph Bentley MD, Director, [facility]) Osteoporosis and Bone Health Services    CC: Avi Ríos MD

## 2022-08-25 ENCOUNTER — HOSPITAL ENCOUNTER (OUTPATIENT)
Dept: MAMMOGRAPHY | Age: 67
Discharge: HOME | End: 2022-08-25
Payer: MEDICARE

## 2022-08-25 VITALS — HEIGHT: 63 IN | BODY MASS INDEX: 16.83 KG/M2 | WEIGHT: 95 LBS

## 2022-08-25 DIAGNOSIS — Z12.31 VISIT FOR SCREENING MAMMOGRAM: ICD-10-CM

## 2022-08-25 PROCEDURE — 77063 BREAST TOMOSYNTHESIS BI: CPT

## 2022-08-31 ENCOUNTER — HOSPITAL ENCOUNTER (OUTPATIENT)
Dept: MAMMOGRAPHY | Age: 67
Discharge: HOME | End: 2022-08-31
Payer: MEDICARE

## 2022-08-31 ENCOUNTER — HOSPITAL ENCOUNTER (OUTPATIENT)
Dept: ULTRASOUND IMAGING | Age: 67
Discharge: HOME | End: 2022-08-31
Payer: MEDICARE

## 2022-08-31 DIAGNOSIS — R92.8 ABNORMAL MAMMOGRAM: ICD-10-CM

## 2022-08-31 PROCEDURE — 77065 DX MAMMO INCL CAD UNI: CPT

## 2022-08-31 PROCEDURE — 76642 ULTRASOUND BREAST LIMITED: CPT

## 2023-05-22 ENCOUNTER — OFFICE VISIT (OUTPATIENT)
Dept: ENDOCRINOLOGY | Age: 68
End: 2023-05-22
Payer: MEDICARE

## 2023-05-22 ENCOUNTER — HOSPITAL ENCOUNTER (OUTPATIENT)
Dept: GENERAL RADIOLOGY | Age: 68
Discharge: HOME | End: 2023-05-22
Payer: MEDICARE

## 2023-05-22 ENCOUNTER — PROCEDURE VISIT (OUTPATIENT)
Dept: ENDOCRINOLOGY | Age: 68
End: 2023-05-22

## 2023-05-22 VITALS
DIASTOLIC BLOOD PRESSURE: 61 MMHG | BODY MASS INDEX: 17.96 KG/M2 | SYSTOLIC BLOOD PRESSURE: 100 MMHG | HEIGHT: 62 IN | WEIGHT: 97.6 LBS

## 2023-05-22 DIAGNOSIS — Z51.81 MEDICATION MONITORING ENCOUNTER: ICD-10-CM

## 2023-05-22 DIAGNOSIS — M81.0 OSTEOPOROSIS, POSTMENOPAUSAL: Primary | ICD-10-CM

## 2023-05-22 DIAGNOSIS — M81.0 OSTEOPOROSIS, POSTMENOPAUSAL: ICD-10-CM

## 2023-05-22 DIAGNOSIS — E55.9 VITAMIN D DEFICIENCY: ICD-10-CM

## 2023-05-22 DIAGNOSIS — M81.0 OSTEOPOROSIS, POSTMENOPAUSAL: Primary | Chronic | ICD-10-CM

## 2023-05-22 PROCEDURE — 77080 DXA BONE DENSITY AXIAL: CPT | Performed by: INTERNAL MEDICINE

## 2023-05-22 PROCEDURE — 1036F TOBACCO NON-USER: CPT | Performed by: INTERNAL MEDICINE

## 2023-05-22 PROCEDURE — 77080 DXA BONE DENSITY AXIAL: CPT

## 2023-05-22 PROCEDURE — 1123F ACP DISCUSS/DSCN MKR DOCD: CPT | Performed by: INTERNAL MEDICINE

## 2023-05-22 PROCEDURE — 99214 OFFICE O/P EST MOD 30 MIN: CPT | Performed by: INTERNAL MEDICINE

## 2023-05-22 PROCEDURE — G8399 PT W/DXA RESULTS DOCUMENT: HCPCS | Performed by: INTERNAL MEDICINE

## 2023-05-22 PROCEDURE — 3017F COLORECTAL CA SCREEN DOC REV: CPT | Performed by: INTERNAL MEDICINE

## 2023-05-22 PROCEDURE — 1090F PRES/ABSN URINE INCON ASSESS: CPT | Performed by: INTERNAL MEDICINE

## 2023-05-22 PROCEDURE — G8427 DOCREV CUR MEDS BY ELIG CLIN: HCPCS | Performed by: INTERNAL MEDICINE

## 2023-05-22 PROCEDURE — G8419 CALC BMI OUT NRM PARAM NOF/U: HCPCS | Performed by: INTERNAL MEDICINE

## 2023-05-22 RX ORDER — ALENDRONATE SODIUM 70 MG/1
TABLET ORAL
Qty: 12 TABLET | Refills: 4 | Status: SHIPPED | OUTPATIENT
Start: 2023-05-22

## 2023-05-22 NOTE — PROGRESS NOTES
[facility]) Osteoporosis and [address] [facility] [address] [facility]  Phone [phone]  Fax [phone]    PATIENT NAME: Kyleigh Estrada OF BIRTH: 1955  INITIAL CONSULTATION: 12/06/2006  LAST OFFICE VISIT: 04/11/2022  TODAY'S VISIT: 05/22/2023    Labs @  04/2023    PROBLEMS:   Osteoporosis by DXA 12/2006, lowest T-score -2.6 in the right trochanter (Z-score -2.0)     Family history of osteoporosis, mother and father    Low bone density by DXA done 07/2002, lowest Z-score -1.5 LFN    BMD decreased 2003-02/2006, 02/2006-12/2006, 2012/2014, lowest T-score -3.0 RFN    Fractured right shoulder 01/2018 (hard fall, slipped on ice)    11/2021 fractured patella (fell hard), surgery required. Natural menopause age 44 (2006), hysterectomy 11/2018  Vitamin D deficiency, desirable 25-hydroxvvitamin D is 30 to 60    25 ng/mL 05/2006    41 ng/mL 12/2006 with vitamin D 1700 IU daily    42 ng/mL 12/2014    49 ng/mL 12/2017 2020 NEW/RECURRENT, chronic urticaria    CURRENT MANAGEMENT FOR BONE HEALTH:   Calcium, diet 600 mg/d + supplement 375 mg/d = 975 mg/d  Vitamin D, multivitamin 200 IU/d + 500 IU along with calcium + 1000 IU = 1700 IU/d  Exercise, Theresa, cardio and strength training 5 x weekly  Pharmacologic therapy, Fosamax liquid 70 mg weekly started 01/2007, changed to tablets 2010 when liquid was not available, stopped 11/2011 for a holiday, re-started 12/2014 (dissolved in water)    PREVIOUS MEDICATIONS FOR OSTEOPOROSIS:   Actonel 35 mg weekly, 04/2006 stopped due to nausea, headaches  Evista 05/2006 stopped due to hot flushes    OTHER CURRENT MEDICATIONS (SELECTED): Qvar, albuterol, spironolactone, Retin-A,   OTC MEDICATIONS: Selenium, fish oil    CHIEF COMPLAINT: Here for f/u visit of osteoporosis and vitamin D deficiency. No new related signs or symptoms.     PAST MEDICAL HISTORY, FAMILY HISTORY, SOCIAL HISTORY AND REVIEW OF SYSTEMS:  Relevant changes since last visit

## 2023-05-22 NOTE — RESULT ENCOUNTER NOTE
[facility] ([facility]) Osteoporosis and [address] [facility], [address] [place] [address],[facility]  Phone [phone]  Fax [phone]    NAME: Cali Newton   : 1955   STUDY DATE: 2023         REFERRING PROVIDER: Cristino Campbell MD    INDICATION(S) FOR PERFORMING THE STUDY:  osteoporosis, age related (M81.0)    CLINICAL INFORMATION PROVIDED BY THE PATIENT: 73-year-old woman. Natural menopause was age 44. She fractured her right shoulder 2018 (slipped on ice). No long-term corticosteroid use. She took alendronate 2007-2011 (stopped for a [place]). Alendronate was re-started 2014. Family history of osteoporosis (mother and father). EQUIPMENT: [employer]. POSITIONING: Good. REGIONS OF INTEREST: Correct. ARTIFACTS: None. STUDY VALID? Yes. Spine BMD is spuriously high because of generalized degenerative change. No adjustments were made. T-scores  Initial study: 2003* spine L1-L4 Invalid Lowest hip (right fem. neck) -1.9   Current study: 2022 spine L1-L4 ND Lowest hip (right fem. neck) -2.9     The table below shows bone mineral density (grams/cm2), the appropriate measure for comparing serial scans. An increase or decrease is significant based on precision studies done at our center according to the ISCD protocol. Proximal Femur (right)   Date Fem. neck Trochanter Total hip   2003* 0.643 0.480 0.766   2006* 0.589 0.447 0.702   2007* 0.570 0.478 0.694   2011 0.569 0.518 0.658   12/10/2012 0.595 0.450 0.649   10/23/2014* 0.516 0.347 0.598   10/19/2020 0.543 0.414 0.602   2022 0.551 0.443 0.612   2023 0.558 0.426 0.594   *Done with [employer] equipment; BMD converted to Hologic units. IMPRESSION:  BONE DENSITY IS LOW, CONSISTENT WITH OSTEOPOROSIS.    SINCE THE LAST DXA, BMD DID NOT CHANGE SIGNIFICANTLY IN THE RIGHT HIP. COMPARED WITH , BEFORE STARTING ALENDRONATE, BMD IS HIGHER NOW IN THE FEMORAL NECK

## 2023-06-09 LAB — VITAMIN D 25-HYDROXY: 86.2 NG/ML (ref 30–100)

## 2023-09-07 ENCOUNTER — HOSPITAL ENCOUNTER (OUTPATIENT)
Dept: MAMMOGRAPHY | Age: 68
Discharge: HOME | End: 2023-09-07
Payer: MEDICARE

## 2023-09-07 VITALS — WEIGHT: 97 LBS | HEIGHT: 62 IN | BODY MASS INDEX: 17.85 KG/M2

## 2023-09-07 DIAGNOSIS — Z12.31 VISIT FOR SCREENING MAMMOGRAM: ICD-10-CM

## 2023-09-07 PROCEDURE — 77063 BREAST TOMOSYNTHESIS BI: CPT

## 2024-05-30 ENCOUNTER — OFFICE VISIT (OUTPATIENT)
Dept: ENDOCRINOLOGY | Age: 69
End: 2024-05-30

## 2024-05-30 ENCOUNTER — HOSPITAL ENCOUNTER (OUTPATIENT)
Dept: GENERAL RADIOLOGY | Age: 69
Discharge: HOME | End: 2024-05-30
Payer: MEDICARE

## 2024-05-30 VITALS — HEIGHT: 62 IN | BODY MASS INDEX: 17.96 KG/M2 | WEIGHT: 97.6 LBS

## 2024-05-30 DIAGNOSIS — E55.9 VITAMIN D DEFICIENCY: ICD-10-CM

## 2024-05-30 DIAGNOSIS — M81.0 OSTEOPOROSIS, POSTMENOPAUSAL: ICD-10-CM

## 2024-05-30 DIAGNOSIS — Z51.81 MEDICATION MONITORING ENCOUNTER: ICD-10-CM

## 2024-05-30 DIAGNOSIS — M81.0 OSTEOPOROSIS, POSTMENOPAUSAL: Primary | ICD-10-CM

## 2024-05-30 PROCEDURE — 77080 DXA BONE DENSITY AXIAL: CPT

## 2024-05-30 PROCEDURE — 77080 DXA BONE DENSITY AXIAL: CPT | Performed by: INTERNAL MEDICINE

## 2024-05-30 RX ORDER — MULTIVITAMIN
1 TABLET ORAL DAILY
COMMUNITY
End: 2024-05-30

## 2024-05-30 RX ORDER — ZALEPLON 10 MG/1
10 CAPSULE ORAL NIGHTLY
COMMUNITY
Start: 2024-05-06

## 2024-05-30 NOTE — PROGRESS NOTES
[facility]oporosis and Bone Health [facility]  [address]  [facility] [address]  Phone [phone]  Fax [phone]    PATIENT NAME: CHRISTA MALIK  YOB: 1955  INITIAL CONSULTATION: 12/06/2006  LAST OFFICE VISIT: 05/22/2023  TODAY'S VISIT: 05/30/2024    Labs @ [facility] 01/2024    PROBLEMS:   Osteoporosis by DXA 12/2006, lowest T-score -2.6 in the right trochanter (Z-score -2.0)     Family history of osteoporosis, mother and father    Low bone density by DXA done 07/2002, lowest Z-score -1.5 LFN    BMD decreased 2003-02/2006, 02/2006-12/2006, 2012/2014, lowest T-score -3.0 RFN    Fractured right shoulder 01/2018 (hard fall, slipped on ice)    11/2021 fractured patella (fell hard), surgery required.  Natural menopause age 50 (2006), hysterectomy 11/2018  Vitamin D deficiency, desirable 25-hydroxvvitamin D is 30 to 60    25 ng/mL 05/2006    41 ng/mL 12/2006 with vitamin D 1700 IU daily    42 ng/mL 12/2014    49 ng/mL 12/2017    57 ng/mL 01/2024 2020 NEW/RECURRENT, chronic urticaria    CURRENT MANAGEMENT FOR BONE HEALTH:   Calcium, diet 600 mg/d + supplement 375 mg/d = 975 mg/d  Vitamin D, multivitamin 200 IU/d + 500 IU along with calcium + 1000 IU = 1700 IU/d  Exercise, Theresa, cardio and strength training 5 x weekly  Pharmacologic therapy, Fosamax liquid 70 mg weekly started 01/2007, changed to tablets 2010 when liquid was not available, stopped 11/2011 for a “holiday,” re-started 12/2014 (dissolved in water)    PREVIOUS MEDICATIONS FOR OSTEOPOROSIS:   Actonel 35 mg weekly, 04/2006 stopped due to nausea, headaches  Evista 05/2006 stopped due to hot flushes    OTHER CURRENT MEDICATIONS (SELECTED): Qvar, albuterol, spironolactone, Retin-A,   OTC MEDICATIONS: Selenium, fish oil    CHIEF COMPLAINT: Here for f/u visit of osteoporosis and vitamin D deficiency.  No new related signs or symptoms.    PAST MEDICAL HISTORY, FAMILY HISTORY, SOCIAL HISTORY:  Relevant changes since last

## 2024-09-12 ENCOUNTER — HOSPITAL ENCOUNTER (OUTPATIENT)
Dept: MAMMOGRAPHY | Age: 69
Discharge: HOME | End: 2024-09-12
Payer: MEDICARE

## 2024-09-12 VITALS — HEIGHT: 63 IN | BODY MASS INDEX: 17.72 KG/M2 | WEIGHT: 100 LBS

## 2024-09-12 DIAGNOSIS — Z12.31 VISIT FOR SCREENING MAMMOGRAM: ICD-10-CM

## 2024-09-12 PROCEDURE — 77063 BREAST TOMOSYNTHESIS BI: CPT

## 2025-05-13 DIAGNOSIS — M81.0 OSTEOPOROSIS, POSTMENOPAUSAL: Primary | ICD-10-CM

## 2025-06-17 ENCOUNTER — TELEPHONE (OUTPATIENT)
Dept: ENDOCRINOLOGY | Age: 70
End: 2025-06-17

## 2025-06-17 ENCOUNTER — OFFICE VISIT (OUTPATIENT)
Dept: ENDOCRINOLOGY | Age: 70
End: 2025-06-17
Payer: MEDICARE

## 2025-06-17 ENCOUNTER — HOSPITAL ENCOUNTER (OUTPATIENT)
Dept: GENERAL RADIOLOGY | Age: 70
Discharge: HOME | End: 2025-06-17
Payer: MEDICARE

## 2025-06-17 VITALS — BODY MASS INDEX: 18 KG/M2 | HEIGHT: 63 IN

## 2025-06-17 DIAGNOSIS — M81.0 OSTEOPOROSIS, POSTMENOPAUSAL: Primary | ICD-10-CM

## 2025-06-17 DIAGNOSIS — Z51.81 MEDICATION MONITORING ENCOUNTER: ICD-10-CM

## 2025-06-17 DIAGNOSIS — E55.9 VITAMIN D DEFICIENCY: ICD-10-CM

## 2025-06-17 DIAGNOSIS — M81.0 OSTEOPOROSIS, POSTMENOPAUSAL: ICD-10-CM

## 2025-06-17 PROCEDURE — 1090F PRES/ABSN URINE INCON ASSESS: CPT | Performed by: INTERNAL MEDICINE

## 2025-06-17 PROCEDURE — 1159F MED LIST DOCD IN RCRD: CPT | Performed by: INTERNAL MEDICINE

## 2025-06-17 PROCEDURE — G8399 PT W/DXA RESULTS DOCUMENT: HCPCS | Performed by: INTERNAL MEDICINE

## 2025-06-17 PROCEDURE — 77080 DXA BONE DENSITY AXIAL: CPT | Performed by: INTERNAL MEDICINE

## 2025-06-17 PROCEDURE — 3017F COLORECTAL CA SCREEN DOC REV: CPT | Performed by: INTERNAL MEDICINE

## 2025-06-17 PROCEDURE — G8427 DOCREV CUR MEDS BY ELIG CLIN: HCPCS | Performed by: INTERNAL MEDICINE

## 2025-06-17 PROCEDURE — 1123F ACP DISCUSS/DSCN MKR DOCD: CPT | Performed by: INTERNAL MEDICINE

## 2025-06-17 PROCEDURE — 1036F TOBACCO NON-USER: CPT | Performed by: INTERNAL MEDICINE

## 2025-06-17 PROCEDURE — 77080 DXA BONE DENSITY AXIAL: CPT

## 2025-06-17 PROCEDURE — 99214 OFFICE O/P EST MOD 30 MIN: CPT | Performed by: INTERNAL MEDICINE

## 2025-06-17 PROCEDURE — G2211 COMPLEX E/M VISIT ADD ON: HCPCS | Performed by: INTERNAL MEDICINE

## 2025-06-17 PROCEDURE — G8419 CALC BMI OUT NRM PARAM NOF/U: HCPCS | Performed by: INTERNAL MEDICINE

## 2025-06-17 RX ORDER — DENOSUMAB 60 MG/ML
60 INJECTION SUBCUTANEOUS ONCE
Qty: 1 ML | Refills: 0 | Status: SHIPPED | OUTPATIENT
Start: 2025-06-17 | End: 2025-06-17

## 2025-06-17 NOTE — PROGRESS NOTES
[facility] Osteoporosis and Bone Health Services  [address]  [facility] [address]  Phone [phone]  Fax [phone]    PATIENT NAME: CHRISTA MALIK  YOB: 1955  INITIAL CONSULTATION: 12/06/2006  LAST OFFICE VISIT: 05/20/2024  TODAY'S VISIT: 06/17/2025    Labs @ [facility] 12/2024    PROBLEMS:   Osteoporosis by DXA 12/2006, lowest T-score -2.6 in the right trochanter (Z-score -2.0)     Family history of osteoporosis, mother and father    Low bone density by DXA done 07/2002, lowest Z-score -1.5 LFN    BMD decreased 2003-02/2006, 02/2006-12/2006, 2012/2014, lowest T-score -3.0 RFN    Fractured right shoulder 01/2018 (hard fall, slipped on ice)    11/2021 fractured patella (fell hard), surgery required.  Natural menopause age 50 (2006), hysterectomy 11/2018  Vitamin D deficiency, desirable 25-hydroxvvitamin D is 30 to 60    25 ng/mL 05/2006    41 ng/mL 12/2006 with vitamin D 1700 IU daily    42 ng/mL 12/2014    49 ng/mL 12/2017    55 ng/mL 12/2024 2020 NEW/RECURRENT, chronic urticaria  03/2025 LS MR severe spinal stenosis    CURRENT MANAGEMENT FOR BONE HEALTH:   Calcium, diet 600 mg/d + supplement 375 mg/d = 975 mg/d  Vitamin D, multivitamin 200 IU/d + 500 IU along with calcium + 1000 IU = 1700 IU/d  Exercise, Theresa, cardio and strength training 5 x weekly  Pharmacologic therapy, Fosamax liquid 70 mg weekly started 01/2007, changed to tablets 2010 when liquid was not available, stopped 11/2011 for a “holiday,” again 12/2014-05/2024 (dissolved in water)    PREVIOUS MEDICATIONS FOR OSTEOPOROSIS:   Actonel 35 mg weekly, 04/2006 stopped due to nausea, headaches  Evista 05/2006 stopped due to hot flushes    OTHER CURRENT MEDICATIONS (SELECTED): Qvar, albuterol, spironolactone, Retin-A,   OTC MEDICATIONS: Selenium, fish oil    CHIEF COMPLAINT: Here for f/u visit of osteoporosis and vitamin D deficiency.  No new related signs or symptoms.    PAST MEDICAL HISTORY, FAMILY HISTORY,

## 2025-06-18 NOTE — TELEPHONE ENCOUNTER
Fax from [employer] with SOB for prolia. No PA req.     Patient made aware and will call the office back tomorrow when she has her calendar to get scheduled for her injection.

## 2025-06-25 ENCOUNTER — CLINICAL SUPPORT (OUTPATIENT)
Dept: ENDOCRINOLOGY | Age: 70
End: 2025-06-25
Payer: MEDICARE

## 2025-06-25 DIAGNOSIS — M81.0 OSTEOPOROSIS, POSTMENOPAUSAL: Primary | ICD-10-CM

## 2025-06-25 PROCEDURE — 96372 THER/PROPH/DIAG INJ SC/IM: CPT | Performed by: INTERNAL MEDICINE
